# Patient Record
Sex: FEMALE | Race: BLACK OR AFRICAN AMERICAN | NOT HISPANIC OR LATINO | URBAN - METROPOLITAN AREA
[De-identification: names, ages, dates, MRNs, and addresses within clinical notes are randomized per-mention and may not be internally consistent; named-entity substitution may affect disease eponyms.]

---

## 2022-05-24 ENCOUNTER — OFFICE (OUTPATIENT)
Dept: URBAN - METROPOLITAN AREA CLINIC 102 | Facility: CLINIC | Age: 52
End: 2022-05-24
Payer: MEDICAID

## 2022-05-24 VITALS
DIASTOLIC BLOOD PRESSURE: 93 MMHG | TEMPERATURE: 97.5 F | HEART RATE: 79 BPM | WEIGHT: 228 LBS | HEIGHT: 67 IN | SYSTOLIC BLOOD PRESSURE: 132 MMHG

## 2022-05-24 DIAGNOSIS — R10.13 EPIGASTRIC PAIN: ICD-10-CM

## 2022-05-24 DIAGNOSIS — R19.4 CHANGE IN BOWEL HABIT: ICD-10-CM

## 2022-05-24 DIAGNOSIS — R13.10 DYSPHAGIA, UNSPECIFIED: ICD-10-CM

## 2022-05-24 DIAGNOSIS — K21.9 GASTRO-ESOPHAGEAL REFLUX DISEASE WITHOUT ESOPHAGITIS: ICD-10-CM

## 2022-05-24 DIAGNOSIS — R19.7 DIARRHEA, UNSPECIFIED: ICD-10-CM

## 2022-05-24 DIAGNOSIS — R00.2 PALPITATIONS: ICD-10-CM

## 2022-05-24 DIAGNOSIS — Z80.0 FAMILY HISTORY OF MALIGNANT NEOPLASM OF DIGESTIVE ORGANS: ICD-10-CM

## 2022-05-24 PROCEDURE — 99204 OFFICE O/P NEW MOD 45 MIN: CPT

## 2022-05-24 NOTE — SERVICEHPINOTES
50 yo female present for abd pain, chronic diarreha and acid reflux referred by DR. Jason. 
br
C/o gas, cramping, bloating, epigastric pain and constant diarrhea for 6 months. She is taking shot for migraine headache which reduce her diarrhea. States stomach discomfort after eating. Positive N/V occasionally. Tried pepcid and PPI which did not help her acid reflux. Sometimes dysphagia to solid and liquid. No chronic cough or throat clearing. No voice change. No NSAID. br
br No blood in stool. States that 2 to 3 times a day BM, rated type 6 to 7 at [facility] chart.   Pt states that she did colonoscopy 2 years ago removed polys at [facility]. recall 5 years. Sister had a colon CA at age 16 years old. Father has a lung, kidney and prostate CA.  
br She had EGD about 5 years ago and removed GB. 
brLast seen cardiologist was 6 years ago for palpitation. She reports that she still sometimes has a palpitation. No CP.  
CT ABD/Pelvis w IV oral contrast 04/22/22 unremarkable. br

## 2022-08-04 ENCOUNTER — OFFICE (OUTPATIENT)
Dept: URBAN - METROPOLITAN AREA CLINIC 102 | Facility: CLINIC | Age: 52
End: 2022-08-04

## 2022-08-04 PROCEDURE — 00031: CPT | Performed by: INTERNAL MEDICINE

## 2022-08-26 ENCOUNTER — ON CAMPUS - OUTPATIENT (OUTPATIENT)
Dept: URBAN - METROPOLITAN AREA HOSPITAL 16 | Facility: HOSPITAL | Age: 52
End: 2022-08-26
Payer: MEDICAID

## 2022-08-26 DIAGNOSIS — R19.4 CHANGE IN BOWEL HABIT: ICD-10-CM

## 2022-08-26 DIAGNOSIS — R10.13 EPIGASTRIC PAIN: ICD-10-CM

## 2022-08-26 DIAGNOSIS — K63.5 POLYP OF COLON: ICD-10-CM

## 2022-08-26 PROCEDURE — 45380 COLONOSCOPY AND BIOPSY: CPT | Performed by: INTERNAL MEDICINE

## 2022-09-01 ENCOUNTER — ON CAMPUS - OUTPATIENT (OUTPATIENT)
Dept: URBAN - METROPOLITAN AREA HOSPITAL 16 | Facility: HOSPITAL | Age: 52
End: 2022-09-01
Payer: MEDICAID

## 2022-09-01 DIAGNOSIS — R10.13 EPIGASTRIC PAIN: ICD-10-CM

## 2022-09-01 DIAGNOSIS — K29.60 OTHER GASTRITIS WITHOUT BLEEDING: ICD-10-CM

## 2022-09-01 DIAGNOSIS — K21.9 GASTRO-ESOPHAGEAL REFLUX DISEASE WITHOUT ESOPHAGITIS: ICD-10-CM

## 2022-09-01 PROCEDURE — 43239 EGD BIOPSY SINGLE/MULTIPLE: CPT | Performed by: INTERNAL MEDICINE
